# Patient Record
Sex: FEMALE | Race: WHITE | NOT HISPANIC OR LATINO | URBAN - METROPOLITAN AREA
[De-identification: names, ages, dates, MRNs, and addresses within clinical notes are randomized per-mention and may not be internally consistent; named-entity substitution may affect disease eponyms.]

---

## 2024-06-04 ENCOUNTER — OFFICE (OUTPATIENT)
Dept: URBAN - METROPOLITAN AREA CLINIC 11 | Facility: CLINIC | Age: 54
End: 2024-06-04

## 2024-06-04 VITALS
DIASTOLIC BLOOD PRESSURE: 78 MMHG | SYSTOLIC BLOOD PRESSURE: 114 MMHG | HEIGHT: 62 IN | OXYGEN SATURATION: 97 % | HEART RATE: 82 BPM | WEIGHT: 191 LBS

## 2024-06-04 DIAGNOSIS — K76.0 FATTY (CHANGE OF) LIVER, NOT ELSEWHERE CLASSIFIED: ICD-10-CM

## 2024-06-04 DIAGNOSIS — I10 ESSENTIAL (PRIMARY) HYPERTENSION: ICD-10-CM

## 2024-06-04 DIAGNOSIS — R74.01 ELEVATION OF LEVELS OF LIVER TRANSAMINASE LEVELS: ICD-10-CM

## 2024-06-04 DIAGNOSIS — E78.00 PURE HYPERCHOLESTEROLEMIA, UNSPECIFIED: ICD-10-CM

## 2024-06-04 PROCEDURE — 99204 OFFICE O/P NEW MOD 45 MIN: CPT | Performed by: NURSE PRACTITIONER

## 2024-06-04 NOTE — SERVICEHPINOTES
Ms. Harrell is a pleasant 53-year-old  female with a PMH significant for essential hypertension, hypercholesterolemia.  She was referred from PCP office due to transaminitis.  She states she 1st noted elevation of transaminase levels April 2024 at PCP office.  These have somewhat improved since.  She has had lab workup with [facility] that was negative, acute hepatitis panel that was negative, Total bilirubin is been unrevealing since April. Alkaline phosphatase has been unrevealing. AST started out 64 in April and now down to 55. [facility] negative, GGT normal cortisol normal range. B12 okay. Platelets 265, H&ampH normal. She has had abdominal ultrasound but did note hepatic steatosis. She denies any significant alcohol use. States she may have a couple drinks a couple times a month. No history of chronic use. She does admit to a vague right upper quadrant sensation at times sometimes related to what she eats. Adamant no NSAID use. No excessive Tylenol use. No herbal supplements or energy drinks. Which she was taking some vitamins at night, but has since stopped. Denies nausea, pruritus, jaundice. She denies any family history of chronic liver disease. We did discuss Mediterranean diet and weight loss as this will help fatty liver to reverse and prevent progression and fibrosis. We discussed obtaining a baseline fibro scan also labs rule out other causes of elevation of transaminase levels at her PCP did not check. She has never underwent screening colonoscopy for any reason the past. We discussed that 46 years of age is time to start screening. She wants to hold off for now. She is aware of risk on holding off. We will schedule once she is ready. Denies any family history of colon cancer, stomach cancer, pancreatic cancer, IBD. She is having regular bowel movements without blood or mucus.

## 2024-06-19 ENCOUNTER — OFFICE (OUTPATIENT)
Dept: URBAN - METROPOLITAN AREA CLINIC 11 | Facility: CLINIC | Age: 54
End: 2024-06-19
Payer: COMMERCIAL

## 2024-06-19 VITALS — HEIGHT: 62 IN

## 2024-06-19 DIAGNOSIS — K76.0 FATTY (CHANGE OF) LIVER, NOT ELSEWHERE CLASSIFIED: ICD-10-CM

## 2024-06-19 PROCEDURE — 76981 USE PARENCHYMA: CPT | Performed by: NURSE PRACTITIONER
